# Patient Record
Sex: FEMALE | Race: WHITE | NOT HISPANIC OR LATINO | URBAN - METROPOLITAN AREA
[De-identification: names, ages, dates, MRNs, and addresses within clinical notes are randomized per-mention and may not be internally consistent; named-entity substitution may affect disease eponyms.]

---

## 2023-08-01 VITALS — HEIGHT: 66 IN

## 2023-08-01 DIAGNOSIS — O36.5930 POOR FETAL GROWTH AFFECTING PREGNANCY IN THIRD TRIMESTER, SINGLE OR UNSPECIFIED FETUS: Primary | ICD-10-CM

## 2023-08-10 ENCOUNTER — OFFICE VISIT (OUTPATIENT)
Dept: MATERNAL FETAL MEDICINE | Facility: CLINIC | Age: 28
End: 2023-08-10
Payer: MEDICAID

## 2023-08-10 VITALS
SYSTOLIC BLOOD PRESSURE: 120 MMHG | OXYGEN SATURATION: 98 % | DIASTOLIC BLOOD PRESSURE: 78 MMHG | WEIGHT: 141 LBS | BODY MASS INDEX: 22.76 KG/M2 | RESPIRATION RATE: 22 BRPM | HEART RATE: 105 BPM

## 2023-08-10 DIAGNOSIS — O36.5930 POOR FETAL GROWTH AFFECTING PREGNANCY IN THIRD TRIMESTER, SINGLE OR UNSPECIFIED FETUS: ICD-10-CM

## 2023-08-10 PROCEDURE — 1159F MED LIST DOCD IN RCRD: CPT | Mod: CPTII,S$GLB,, | Performed by: OBSTETRICS & GYNECOLOGY

## 2023-08-10 PROCEDURE — 3008F BODY MASS INDEX DOCD: CPT | Mod: CPTII,S$GLB,, | Performed by: OBSTETRICS & GYNECOLOGY

## 2023-08-10 PROCEDURE — 76819 FETAL BIOPHYS PROFIL W/O NST: CPT | Mod: 26,,, | Performed by: OBSTETRICS & GYNECOLOGY

## 2023-08-10 PROCEDURE — 3074F PR MOST RECENT SYSTOLIC BLOOD PRESSURE < 130 MM HG: ICD-10-PCS | Mod: CPTII,S$GLB,, | Performed by: OBSTETRICS & GYNECOLOGY

## 2023-08-10 PROCEDURE — 76811 OB US DETAILED SNGL FETUS: CPT | Mod: 26,,, | Performed by: OBSTETRICS & GYNECOLOGY

## 2023-08-10 PROCEDURE — 99204 OFFICE O/P NEW MOD 45 MIN: CPT | Mod: 25,TH,S$GLB, | Performed by: OBSTETRICS & GYNECOLOGY

## 2023-08-10 PROCEDURE — 3074F SYST BP LT 130 MM HG: CPT | Mod: CPTII,S$GLB,, | Performed by: OBSTETRICS & GYNECOLOGY

## 2023-08-10 PROCEDURE — 1159F PR MEDICATION LIST DOCUMENTED IN MEDICAL RECORD: ICD-10-PCS | Mod: CPTII,S$GLB,, | Performed by: OBSTETRICS & GYNECOLOGY

## 2023-08-10 PROCEDURE — 3078F PR MOST RECENT DIASTOLIC BLOOD PRESSURE < 80 MM HG: ICD-10-PCS | Mod: CPTII,S$GLB,, | Performed by: OBSTETRICS & GYNECOLOGY

## 2023-08-10 PROCEDURE — 3078F DIAST BP <80 MM HG: CPT | Mod: CPTII,S$GLB,, | Performed by: OBSTETRICS & GYNECOLOGY

## 2023-08-10 PROCEDURE — 99204 PR OFFICE/OUTPT VISIT, NEW, LEVL IV, 45-59 MIN: ICD-10-PCS | Mod: 25,TH,S$GLB, | Performed by: OBSTETRICS & GYNECOLOGY

## 2023-08-10 PROCEDURE — 3008F PR BODY MASS INDEX (BMI) DOCUMENTED: ICD-10-PCS | Mod: CPTII,S$GLB,, | Performed by: OBSTETRICS & GYNECOLOGY

## 2023-08-10 PROCEDURE — 76819 PR US, OB, FETAL BIOPHYSICAL, W/O NST: ICD-10-PCS | Mod: 26,,, | Performed by: OBSTETRICS & GYNECOLOGY

## 2023-08-10 PROCEDURE — 76811 PR US, OB FETAL EVAL & EXAM, TRANSABDOM,FIRST GESTATION: ICD-10-PCS | Mod: 26,,, | Performed by: OBSTETRICS & GYNECOLOGY

## 2023-08-10 RX ORDER — PANTOPRAZOLE SODIUM 40 MG/1
TABLET, DELAYED RELEASE ORAL
COMMUNITY
Start: 2023-07-31

## 2023-08-10 RX ORDER — BUPROPION HYDROCHLORIDE 150 MG/1
TABLET ORAL
COMMUNITY
Start: 2023-08-07

## 2023-08-10 NOTE — LETTER
August 21, 2023        Dony Farmer MD  [address]  [facility] [address]             [facility]  [address]  [place] [address]  Phone: [phone]  Fax: [phone]   Patient: Tanna Hays   MR Number: [id number]   YOB: 1995   Date of Visit: 8/10/2023       Dear Dr. Farmer:    Thank you for referring Tanna Hays to me for evaluation. Attached you will find relevant portions of my assessment and plan of care.    If you have questions, please do not hesitate to call me. I look forward to following Tanna Hays along with you.    Sincerely,      Rai Barr Jr., MD            CC  No Recipients    Enclosure

## 2023-08-10 NOTE — PROGRESS NOTES
Tanna is here for initial MFM consultation, referred by Dr. Farmer for poor fetal growth, late prenatal care, ETOH use in pregnancy, and Suboxone for opiate addiction (self d/c'd 3 weeks ago.)    She is feeling fetal movement.    Tanna denies vaginal bleeding, loss of fluid, recurrent contractions.    She takes Wellbutrin  mg PO daily and Protonix PO daily.    Vitals:    08/10/23 1039   BP: 120/78   Pulse: 105   Resp: (!) 22   SpO2: 98%   Weight: 64 kg (141 lb)      BMI:                    22.76 kg/m^2

## 2023-08-10 NOTE — PROGRESS NOTES
Indication for consultation:  Late prenatal care with poor dates vs fetal growth restriction  Hx opiate abuse  Fetus at risk for fetal alcohol syndrome    Provider requesting consultation: Dony Farmer MD    Dear Dr. Farmer,    Thank you very much for your referral of Tanna Hays who was seen for initial perinatology consultation and sonography today.  She is a 26yo  Your patient reports a last menstrual period of 2022.  She emphasizes however that she is really not sure what her true last menstrual period was as her periods were not regular and she recalls she may have had some bleeding in late December.  If we were to still use the last menstrual period of 2022 that would correspond to a due date of 2023.  Her very 1st ultrasound that I see documented, and that the patient reports, occurred on 2023.  The biometry at that time showed the baby at 26 weeks and 4 days and corresponding to a due date of 2023.  It mentions also that at that time the due date that was being used was 2023 based on her last menstrual period from your office records.  The last menstrual period that you have documented in your records is 23, which again corresponds to a due date of 2023, not 2023.  My recommendation, therefore, while it still represents poor dates, is to use the due date established by her 1st ultrasound which is 2023 because it is greater than 14 days from her due date of 2023 established by her unsure LMP, and because it is truly the only correct date that we have since she really was not sure about her last menstrual period.    With this recommended change, using an EDC of 23, she is 32 weeks and 5 days today.    Ultrasound suggested that the baby was measuring small which is the primary reason she was referred to us.  But it was measuring small as compared to the incorrect due date by her last menstrual period.  Patient is  high-risk and had late prenatal care because of history of opiate addiction and alcoholism.  She reports not having had any alcohol for 6 weeks but prior to that was drinking a bottle of vodka per day.  Looks like you placed her on Suboxone maintenance.  She reports some anxiety, depression, fatigue, difficulty sleeping.  Father of the baby is not an abuser.  She is accompanied by her mother today who seems to be very supportive.  Patient reports good fetal movement and denies any vaginal bleeding, leakage of fluid, or cramping.    PMH:  Opiate abuse, alcoholism, anxiety and depression as outlined above.  Denies any suicidal or homicidal ideation    Ob Hx:  Patient has had 2 early losses and an ectopic in 2014 and two SABs.  She had one uncomplicated term vaginal delivery and one vaginal delivery at 36 weeks of a 7lb 2 oz male infant.    PSH:  Tonsillectomy, D&C, endometrial biopsy    Family hx:  Unremarkable    SOC:  Long history of opiate addiction, currently on Suboxone maintenance prescribed by you.  She quit cigarettes at the onset of this pregnancy.  Quit drinking alcohol 6 weeks ago, was of bottle of vodka per day    Medications:  Buprenorphine 8 mg sublingual 3 times daily but patient states that she discontinued this herself.  Her 1st child was opiate dependent.  She is also on bupropion  mg daily and clonazepam 0.5 mg as needed but reports she is not taking this.  She also takes Protonix.    Allergies:  All penicillins    Review of systems: The patient denies any vaginal bleeding, loss of fluid or contraction pain today.  Vitals:    08/10/23 1039   BP: 120/78   Pulse: 105   Resp: (!) 22   SpO2: 98%   Weight: 64 kg (141 lb)     Body mass index is 22.76 kg/m².      Physical exam:  Gen: WDWN in NAD  HEENT: WNL  Abdomen: Soft, non-tender, non-distended,   Skin: No rash or jaundice  Extremities: Symmetric in size, no clubbing, cyanosis, or edema  Neuro: Grossly intact    Ultrasound:  We demonstrated a  Hopper gestation in cephalic presentation.  Heart rate 126 beats per minute.  Three-vessel cord is seen.  Placenta posterior fundal.  Maximum vertical pocket 4.2 cm.  BPD 31 weeks and 6 days, head circumference 31 weeks and 3 days, abdominal circumference 32 weeks and 4 days which is around the 40th percentile, and femur length 30 weeks and 1 day.  Estimated fetal weight 1812 g or 3 lb 16 oz or 30 weeks and 4 days which is at the 16th percentile.  Please see attached report regarding details of the fetal anatomy.  Will we could see was reassuring.  In the fetal heart the left ventricular outflow tract was suboptimal as was detailed of the distal extremities.  Biophysical profile 8/8.  Umbilical artery Doppler was performed prior to me change in the due date but ultimately actually not indicated.  The S/D ratio is normal at 2.29    Counseling:  I told the patient I would need some time to sort for the records to try to forgot what are correct due date is as feel that is the primary issue here.  We discussed importance of her staying clean.  She understands her fetus is at risk for fetal alcohol syndrome.  I commended her for quitting alcohol and opiates to improve the likelihood of a good outcome with this pregnancy.  She is not reporting withdrawal symptoms I have discontinued the Suboxone.  Told her we would bring her back here in 2 - 3 weeks and compare today's ultrasound to the 1 then have a better idea of how this babies growing.  I reminded her to be cognizant of fetal movement.    Assessment:  Intrauterine pregnancy at 32w5d (PLEASE NOTE CHANGE IN EDC)  Late prenatal care/poor pregnancy dates  Fetus at risk for Fetal Alcohol Syndrome  Hx opiate dependence, currently off of maintenance    Recommendations:   Dr. Farmer, please note my recommended change in her due date and therefore change in her gestational age as outlined in my 1st paragraph.  I have also made these changes on the accompanying ultrasound  report.  Again, this is going by her earliest ultrasound which is at 26 weeks per the patient's report in the setting of unsure LMP.  If they were earlier ultrasounds that were used to date her pregnancy, or any additional information that I do not have available that would alter my recommended change, I asked that she please contact me at [phone].  The fetus came out to the 16th percentile overall today and the abdominal circumference was normal.  The best way to tell how her baby is growing is to follow its growth over time some bringing her back here again in 2-3 weeks and will compare are 2 ultrasounds.  The patient was accompanied by her mother today to provide support.  The patient was drinking heavily during the first 20 weeks of this pregnancy therefore this fetus is at risk for fetal alcohol syndrome.  She reports being clean since then.  She denies any recent opiate use and actually self discontinued the Buprenophine 3 weeks ago and has not reported any withdrawal symptoms.  Recommend weekly  testing, we will take care of it on the weeks that she comes to see us  I can not give recommendations on timing of delivery as of yet.  I reminded the patient to live healthy and to be cognizant of fetal movement.    Thanks once again for allowing us to participate in the care of your patients.  If you have any questions about today's consultation feel free to contact me or my partners at [phone].    Sincerely,    Rai Barr Jr., M.D.  Maternal-Fetal Medicine     Total time personally involved in this patient's care was 50 minutes.

## 2023-08-15 DIAGNOSIS — O36.5930 POOR FETAL GROWTH AFFECTING PREGNANCY IN THIRD TRIMESTER, SINGLE OR UNSPECIFIED FETUS: Primary | ICD-10-CM

## 2023-08-19 PROBLEM — O36.5930 POOR FETAL GROWTH COMPLICATING PREGNANCY IN THIRD TRIMESTER, ANTEPARTUM: Status: ACTIVE | Noted: 2023-08-19

## 2023-08-28 ENCOUNTER — PROCEDURE VISIT (OUTPATIENT)
Dept: MATERNAL FETAL MEDICINE | Facility: CLINIC | Age: 28
End: 2023-08-28
Payer: MEDICAID

## 2023-08-28 VITALS
RESPIRATION RATE: 18 BRPM | WEIGHT: 146 LBS | OXYGEN SATURATION: 98 % | DIASTOLIC BLOOD PRESSURE: 80 MMHG | SYSTOLIC BLOOD PRESSURE: 124 MMHG | BODY MASS INDEX: 23.57 KG/M2 | HEART RATE: 94 BPM

## 2023-08-28 DIAGNOSIS — O36.5930 POOR FETAL GROWTH AFFECTING PREGNANCY IN THIRD TRIMESTER, SINGLE OR UNSPECIFIED FETUS: ICD-10-CM

## 2023-08-28 PROCEDURE — 99213 OFFICE O/P EST LOW 20 MIN: CPT | Mod: 25,TH,S$GLB, | Performed by: OBSTETRICS & GYNECOLOGY

## 2023-08-28 PROCEDURE — 76819 PR US, OB, FETAL BIOPHYSICAL, W/O NST: ICD-10-PCS | Mod: 26,,, | Performed by: OBSTETRICS & GYNECOLOGY

## 2023-08-28 PROCEDURE — 76816 PR  US,PREGNANT UTERUS,F/U,TRANSABD APP: ICD-10-PCS | Mod: 26,,, | Performed by: OBSTETRICS & GYNECOLOGY

## 2023-08-28 PROCEDURE — 76816 OB US FOLLOW-UP PER FETUS: CPT | Mod: 26,,, | Performed by: OBSTETRICS & GYNECOLOGY

## 2023-08-28 PROCEDURE — 76820 UMBILICAL ARTERY ECHO: CPT | Mod: 26,,, | Performed by: OBSTETRICS & GYNECOLOGY

## 2023-08-28 PROCEDURE — 76820 PR US, OB DOPPLER, FETAL UMBILICAL ARTERY ECHO: ICD-10-PCS | Mod: 26,,, | Performed by: OBSTETRICS & GYNECOLOGY

## 2023-08-28 PROCEDURE — 76819 FETAL BIOPHYS PROFIL W/O NST: CPT | Mod: 26,,, | Performed by: OBSTETRICS & GYNECOLOGY

## 2023-08-28 PROCEDURE — 99213 PR OFFICE/OUTPT VISIT, EST, LEVL III, 20-29 MIN: ICD-10-PCS | Mod: 25,TH,S$GLB, | Performed by: OBSTETRICS & GYNECOLOGY

## 2023-08-28 NOTE — PROGRESS NOTES
Tanna is here for followup [facility] consultation for poor fetal growth, late and inconsistent prenatal care, and alcohol use in pregnancy referred by Dr. Farmer.    She is feeling fetal movement.    Tanna denies vaginal bleeding, loss of fluid, recurrent contractions.    She takes Wellbrutrin  mg PO daily and Protonix.    She states that she is still alcohol free x 2 months.    Vitals:    08/28/23 1104   BP: 124/80   Pulse: 94   Resp: 18   SpO2: 98%   Weight: 66.2 kg (146 lb)     BMI:                    23.57 kg/m^2

## 2023-08-28 NOTE — LETTER
August 28, 2023        Dony Farmer MD  [address]  [facility] [address]             [facility]  [address]  [place] [address]  Phone: [phone]  Fax: [phone]   Patient: Tanna Hays   MR Number: [id number]   YOB: 1995   Date of Visit: 8/28/2023       Dear Dr. Farmer:    Thank you for referring Tanna Hays to me for evaluation. Below are the relevant portions of my assessment and plan of care.            If you have questions, please do not hesitate to call me. I look forward to following Tanna along with you.    Sincerely,      Rai Barr Jr., MD           CC  No Recipients

## 2023-08-28 NOTE — PROGRESS NOTES
Indication for follow up consultation:  Intrauterine pregnancy at 35w2d  Intrauterine growth restriction and poor pregnancy dating  Fetus at risk for fetal alcohol syndrome  History of opiate dependence currently off of maintenance therapy    Provider requesting consultation: Dony Farmer MD    Dear Dr. Farmer,    I had the pleasure of seeing Tanna Hays for follow up consultation and sonography today.  Thank you again for allowing us to assist in her care.  As you recall she is a 27 y.o.  at 35w2d.  As a reminder this is a change in her due date as outlined in detail from my previous dictation where I felt her correct due date should be 2023.  Your patient is accompanied by the father of the baby today.  Yaneth has no complaints.  She denies any alcohol use.  She remains off of the Suboxone.  She is on Wellbutrin extended release 150 mg daily.  She reports fetal movement and denies any vaginal bleeding, leakage of fluid, or cramping.  She is not reporting any withdrawal symptoms.    Review of systems: The patient denies any vaginal bleeding, loss of fluid or contraction pain today.  Vitals:    23 1104   BP: 124/80   Pulse: 94   Resp: 18   SpO2: 98%   Weight: 66.2 kg (146 lb)     Body mass index is 23.57 kg/m².      Physical exam:  Gen: WDWN in NAD  HEENT: WNL  Abdomen: Soft, non-tender, non-distended  Skin: No rash or jaundice  Extremities: Symmetric in size, no clubbing, cyanosis, or edema  Neuro: Grossly intact    Ultrasound:  A repeat detailed fetal anatomical survey was completed once again today.  We demonstrated a Hopper in cephalic presentation.  Baby's down to the 7th percentile overall today or 4 lb 11 oz. MANDY is 15 cm.  BPP is 8/8.  Umbilical artery S/D ratios normal.  Very normal fetal movement was seen.  What we could see the anatomy was reassuring.  Please SEE ATTACHED REPORT for full details.      Assessment:  Intrauterine pregnancy at 35w2d  Intrauterine growth  restriction and poor pregnancy dating  Fetus at risk for fetal alcohol syndrome  History of opiate dependence currently off of maintenance therapy    Recommendations:   Please be sure to read my note from her 1st visit with us on 08/10/2023 where I outlined the recommended change in her due date.  If you have a conflict with this change I ask that you please contact me  While the dating of her pregnancy is still suboptimal given her late care, we have demonstrated the rate of growth of the baby to drop from the 16th percentile down to the 7th percentile today.   testing is still reassuring so continuation of the pregnancy is appropriate.  We do not have to see her here again  I recommend weekly  testing in your office until delivery with a low threshold to deliver for any testing abnormalities or complaints of decreased fetal movement  If she remains undelivered by 38 weeks and 0 days I would proceed with delivery around that time give her take a couple of days in either direction.  This fetus has multiple risk factors for  loss therefore pushing the pregnancy excessively is not recommended.  Please notify the pediatrician as this baby is at risk for fetal alcohol syndrome      We greatly appreciate you allowing us to assist in her care.  Please call with any questions or concerns.    Sincerely,    Rai Barr Jr., M.D.  Maternal Fetal Medicine    Total time personally involved in this patient's care was 27 minutes.

## 2023-11-20 PROBLEM — O36.5930 POOR FETAL GROWTH COMPLICATING PREGNANCY IN THIRD TRIMESTER, ANTEPARTUM: Status: RESOLVED | Noted: 2023-08-19 | Resolved: 2023-11-20
